# Patient Record
Sex: FEMALE | Race: WHITE | URBAN - METROPOLITAN AREA
[De-identification: names, ages, dates, MRNs, and addresses within clinical notes are randomized per-mention and may not be internally consistent; named-entity substitution may affect disease eponyms.]

---

## 2017-02-23 ENCOUNTER — TRANSFERRED RECORDS (OUTPATIENT)
Dept: HEALTH INFORMATION MANAGEMENT | Facility: CLINIC | Age: 27
End: 2017-02-23

## 2017-05-15 ASSESSMENT — ENCOUNTER SYMPTOMS
EYE REDNESS: 0
POOR WOUND HEALING: 0
DIFFICULTY URINATING: 0
SORE THROAT: 0
DIARRHEA: 0
DYSURIA: 0
NAIL CHANGES: 0
RECTAL PAIN: 0
DISTURBANCES IN COORDINATION: 0
STIFFNESS: 0
WEAKNESS: 0
VOMITING: 0
SKIN CHANGES: 0
PANIC: 0
RECTAL BLEEDING: 0
HOT FLASHES: 0
SPEECH CHANGE: 0
NECK PAIN: 1
TINGLING: 0
SINUS CONGESTION: 1
HEMATURIA: 0
ARTHRALGIAS: 1
BOWEL INCONTINENCE: 0
CONSTIPATION: 0
SINUS PAIN: 0
MUSCLE WEAKNESS: 0
DEPRESSION: 1
FLANK PAIN: 0
EYE WATERING: 0
DIZZINESS: 0
DOUBLE VISION: 0
EYE IRRITATION: 0
PARALYSIS: 0
BRUISES/BLEEDS EASILY: 1
NECK MASS: 0
NAUSEA: 0
BLOATING: 0
NERVOUS/ANXIOUS: 1
SWOLLEN GLANDS: 0
TROUBLE SWALLOWING: 0
ABDOMINAL PAIN: 0
SEIZURES: 0
INSOMNIA: 0
HEADACHES: 1
TASTE DISTURBANCE: 0
DECREASED CONCENTRATION: 0
MEMORY LOSS: 1
DECREASED LIBIDO: 1
LOSS OF CONSCIOUSNESS: 0
MYALGIAS: 0
BLOOD IN STOOL: 0
NUMBNESS: 0
HOARSE VOICE: 1
TREMORS: 0
MUSCLE CRAMPS: 0
JOINT SWELLING: 0
HEARTBURN: 1
EYE PAIN: 0
SMELL DISTURBANCE: 0
JAUNDICE: 0
BACK PAIN: 0

## 2017-05-15 ASSESSMENT — ANXIETY QUESTIONNAIRES
GAD7 TOTAL SCORE: 7
GAD7 TOTAL SCORE: 7
7. FEELING AFRAID AS IF SOMETHING AWFUL MIGHT HAPPEN: 0 = NOT AT ALL

## 2017-05-16 ENCOUNTER — OFFICE VISIT (OUTPATIENT)
Dept: OBGYN | Facility: CLINIC | Age: 27
End: 2017-05-16
Attending: OBSTETRICS & GYNECOLOGY
Payer: COMMERCIAL

## 2017-05-16 VITALS
SYSTOLIC BLOOD PRESSURE: 127 MMHG | DIASTOLIC BLOOD PRESSURE: 78 MMHG | HEART RATE: 79 BPM | BODY MASS INDEX: 24.35 KG/M2 | WEIGHT: 151.5 LBS | HEIGHT: 66 IN

## 2017-05-16 DIAGNOSIS — D25.1 INTRAMURAL LEIOMYOMA OF UTERUS: Primary | ICD-10-CM

## 2017-05-16 PROCEDURE — 99213 OFFICE O/P EST LOW 20 MIN: CPT | Mod: ZF

## 2017-05-16 ASSESSMENT — PAIN SCALES - GENERAL: PAINLEVEL: NO PAIN (0)

## 2017-05-16 ASSESSMENT — ANXIETY QUESTIONNAIRES: GAD7 TOTAL SCORE: 7

## 2017-05-16 NOTE — LETTER
"5/16/2017       RE: Indigo Daniels  [address]  [place] [address]     Dear Colleague,    Thank you for referring your patient, Indigo Daniels, to the [facility] at [facility]. Please see a copy of my visit note below.    25 yo P0 with 1 year history of worsening dysmenorrhea.  Had an episode in July, 2016 where she had increased pain for 2 weeks and was seen by a NP at [facility].  Ultrasound and MRI showed large posterior fibroid, ~10 cm which encroaches on endometrium.  She saw GYN who discussed Lupron and myomectomy, possibly robotic assisted.  Since then symptoms have been similar.  Not currently on any form of birth control other than condoms.  Does not currently desire pregnancy, but may in future.  She occasionally feels constipated and will typically have multiple bowel movements daily.  Does note increase urinary frequency, going about every 2 hours.  No nocturia.  Does not fluid restrict.  Continues to have pain with intercourse, worse on initial penetration.  Limits sexual activity secondary to pain.  Has not noted that lubricants or position changes helps with pain.    Patient states that her menses are q 31 days and last 4-6 day, heavier on first day, but not soaking a pad in one hour.  No history of STIs or abnormal pap smears.      Past Medical History:   Diagnosis Date     Rectal fissure      Past Surgical History:   Procedure Laterality Date     COLONOSCOPY  5/12/2014     Family History   Problem Relation Age of Onset     Uterine Cancer Paternal Grandmother      Other Cancer Mother      Skin Cancer     Anxiety Disorder Mother      Depression Mother      DIABETES Maternal Grandmother      Physical exam:  /78  Pulse 79  Ht 1.676 m (5' 6\")  Wt 68.7 kg (151 lb 8 oz)  LMP 05/16/2017  Breastfeeding? No  BMI 24.45 kg/m2  Gen'l: appears well, no distress  Abdomen: soft, NT, ND, no palpable masses  Vulva: normal, no " lesions  Urethra: normal  Vagina: pink, normal rugae, phsyiologic discharge present  Cervix: nulliparous, no lesion, no bleeding, distorted anteriorly  Uterus: minimal mobility, NT,posterior fibroid fills cul-de-sac, 14 week size  Adnexa: no palpable masses, NT  Rectum: Anus normal, no fissures noted, no rectovaginal exam done    U/s and MRI from 7/2016 reviewed  Ultrasound: uterus 9.5 x 3.1 x 5.3 cm, large mass extending from or immediately adjacent to the uterus posteriorly measuring approximately 10.7 x 0.7 x 8.4 cm. Endometrium 0.5cm, normal ovaries, no free fluid    MRI with contrast:  Heterogeneously enhancing mass within the posterior aspect of the uterus consistnet with a leiomyoma, measures 8.4 x 10.1 x 8.9 cm, abuts but doesn't involve the endometrium, intramural in location. Normal ovaries, endometrium 1.0 cm, junctional zone unremarkable    Assessment/Plan:  25 yo P0 with large symptomatic intramural fibroid    Medical and surgical options which are applicable for differing symptoms were reviewed including: OCPs, Mirena IUD, hysteroscopy, ablation, myomectomy, UFE, u/s surgery at [place] and hysterectomy.  Discussed in given patient's desire for future pregnancy, and symptoms most related to mass likely best options include myomectomy.  We discussed the possible benefit of Lupron prior to surgery, but given the position of the myoma and limited mobility of the uterus on exam I counseled that I thought pre-operative Lupron may still not allow robotic assisted laparoscopic surgery.  Patient is concerned about the side effects and potential costs of Lupron.  She will consider the options, but is leaning toward abdominal myomectomy.      I will review the MR images when I am able to see them.  I will contact her at that point to discussed more details of planned surgery.    Risk, benefits and alternatives of surgery including bleeding, infection, damage to nearby structures including but not limited to bladder,  ureter, bowel, blood vessels and nerves.  We discussed the differences in surgery times, risks and benefits of laparoscopic (robotic assisted) vs open myomectomy.  We discussed impact on future fertility and need for  deliveries and we discussed post-op recovery.    Recommend follow-up with PCP for non-gyn related complaints on ROS.    Tami Kolb MD

## 2017-05-16 NOTE — PROGRESS NOTES
25 yo P0 with 1 year history of worsening dysmenorrhea.  Had an episode in July, 2016 where she had increased pain for 2 weeks and was seen by a NP at [facility].  Ultrasound and MRI showed large posterior fibroid, ~10 cm which encroaches on endometrium.  She saw GYN who discussed Lupron and myomectomy, possibly robotic assisted.  Since then symptoms have been similar.  Not currently on any form of birth control other than condoms.  Does not currently desire pregnancy, but may in future.  She occasionally feels constipated and will typically have multiple bowel movements daily.  Does note increase urinary frequency, going about every 2 hours.  No nocturia.  Does not fluid restrict.  Continues to have pain with intercourse, worse on initial penetration.  Limits sexual activity secondary to pain.  Has not noted that lubricants or position changes helps with pain.    Patient states that her menses are q 31 days and last 4-6 day, heavier on first day, but not soaking a pad in one hour.  No history of STIs or abnormal pap smears.    Answers for HPI/ROS submitted by the patient on 5/15/2017 , reviewed and confirmed today  OPAL 7 TOTAL SCORE: 7  Q1: Little interest or pleasure in doing things: 0=Not at all  Q2: Feeling down, depressed or hopeless: 1=Several days  PHQ-2 Score: 1  General Symptoms: No  Skin Symptoms: Yes  HENT Symptoms: Yes  EYE SYMPTOMS: Yes  HEART SYMPTOMS: No  LUNG SYMPTOMS: No  INTESTINAL SYMPTOMS: Yes  URINARY SYMPTOMS: Yes  GYNECOLOGIC SYMPTOMS: Yes  BREAST SYMPTOMS: No  SKELETAL SYMPTOMS: Yes  BLOOD SYMPTOMS: Yes  NERVOUS SYSTEM SYMPTOMS: Yes  MENTAL HEALTH SYMPTOMS: Yes  Changes in hair: Yes  Changes in moles/birth marks: No  Itching: No  Rashes: No  Changes in nails: No  Acne: No  Hair in places you don't want it: No  Change in facial hair: No  Warts: No  Non-healing sores: No  Scarring: No  Flaking of skin: No  Color changes of hands/feet in cold : No  Sun sensitivity: No  Skin thickening:  No  Ear pain: No  Ear discharge: No  Hearing loss: No  Tinnitus: No  Nosebleeds: No  Congestion: Yes  Sinus pain: No  Trouble swallowing: No   Voice hoarseness: Yes  Mouth sores: No  Sore throat: No  Tooth pain: No  Gum tenderness: No  Bleeding gums: No  Change in taste: No  Change in sense of smell: No  Dry mouth: No  Hearing aid used: No  Neck lump: No  Eye pain: No  Vision loss: No  Dry eyes: No  Watery eyes: No  Eye bulging: No  Double vision: No  Flashing of lights: No  Spots: No  Floaters: Yes  Redness: No  Crossed eyes: No  Tunnel Vision: No  Yellowing of eyes: No  Eye irritation: No  Heart burn or indigestion: Yes  Nausea: No  Vomiting: No  Abdominal pain: No  Bloating: No  Constipation: No  Diarrhea: No  Blood in stool: No  Black stools: No  Rectal or Anal pain: No  Fecal incontinence: No  Rectal bleeding: No  Yellowing of skin or eyes: No  Vomit with blood: No  Change in stools: No  Hemorrhoids: No  Trouble holding urine or incontinence: Yes  Pain or burning: No  Trouble starting or stopping: No  Increased frequency of urination: Yes  Blood in urine: No  Decreased frequency of urination: No  Frequent nighttime urination: No  Flank pain: No  Difficulty emptying bladder: No  Back pain: No  Muscle aches: No  Neck pain: Yes  Swollen joints: No  Joint pain: Yes  Bone pain: No  Muscle cramps: No  Muscle weakness: No  Joint stiffness: No  Bone fracture: No  Anemia: No  Swollen glands: No  Easy bleeding or bruising: Yes  Edema or swelling: No  Trouble with coordination: No  Dizziness or trouble with balance: No  Fainting or black-out spells: No  Memory loss: Yes  Headache: Yes  Seizures: No  Speech problems: No  Tingling: No  Tremor: No  Weakness: No  Difficulty walking: No  Paralysis: No  Numbness: No  Bleeding or spotting between periods: No  Heavy or painful periods: No  Irregular periods: No  Vaginal discharge: No  Hot flashes: No  Vaginal dryness: No  Genital ulcers: No  Reduced libido: Yes  Painful  "intercourse: Yes  Difficulty with sexual arousal: No  Post-menopausal bleeding: No  Nervous or Anxious: Yes  Depression: Yes  Trouble sleeping: No  Trouble thinking or concentrating: No  Mood changes: No  Panic attacks: No    Past Medical History:   Diagnosis Date     Rectal fissure      Past Surgical History:   Procedure Laterality Date     COLONOSCOPY  5/12/2014     Family History   Problem Relation Age of Onset     Uterine Cancer Paternal Grandmother      Other Cancer Mother      Skin Cancer     Anxiety Disorder Mother      Depression Mother      DIABETES Maternal Grandmother      Physical exam:  /78  Pulse 79  Ht 1.676 m (5' 6\")  Wt 68.7 kg (151 lb 8 oz)  LMP 05/16/2017  Breastfeeding? No  BMI 24.45 kg/m2  Gen'l: appears well, no distress  Abdomen: soft, NT, ND, no palpable masses  Vulva: normal, no lesions  Urethra: normal  Vagina: pink, normal rugae, phsyiologic discharge present  Cervix: nulliparous, no lesion, no bleeding, distorted anteriorly  Uterus: minimal mobility, NT,posterior fibroid fills cul-de-sac, 14 week size  Adnexa: no palpable masses, NT  Rectum: Anus normal, no fissures noted, no rectovaginal exam done    U/s and MRI from 7/2016 reviewed  Ultrasound: uterus 9.5 x 3.1 x 5.3 cm, large mass extending from or immediately adjacent to the uterus posteriorly measuring approximately 10.7 x 0.7 x 8.4 cm. Endometrium 0.5cm, normal ovaries, no free fluid    MRI with contrast:  Heterogeneously enhancing mass within the posterior aspect of the uterus consistnet with a leiomyoma, measures 8.4 x 10.1 x 8.9 cm, abuts but doesn't involve the endometrium, intramural in location. Normal ovaries, endometrium 1.0 cm, junctional zone unremarkable    Assessment/Plan:  27 yo P0 with large symptomatic intramural fibroid    Medical and surgical options which are applicable for differing symptoms were reviewed including: OCPs, Mirena IUD, hysteroscopy, ablation, myomectomy, UFE, u/s surgery at [place] and " hysterectomy.  Discussed in given patient's desire for future pregnancy, and symptoms most related to mass likely best options include myomectomy.  We discussed the possible benefit of Lupron prior to surgery, but given the position of the myoma and limited mobility of the uterus on exam I counseled that I thought pre-operative Lupron may still not allow robotic assisted laparoscopic surgery.  Patient is concerned about the side effects and potential costs of Lupron.  She will consider the options, but is leaning toward abdominal myomectomy.      I will review the MR images when I am able to see them.  I will contact her at that point to discussed more details of planned surgery.    Risk, benefits and alternatives of surgery including bleeding, infection, damage to nearby structures including but not limited to bladder, ureter, bowel, blood vessels and nerves.  We discussed the differences in surgery times, risks and benefits of laparoscopic (robotic assisted) vs open myomectomy.  We discussed impact on future fertility and need for  deliveries and we discussed post-op recovery.    Recommend follow-up with PCP for non-gyn related complaints on ROS.    Tami Kolb MD

## 2017-05-16 NOTE — MR AVS SNAPSHOT
"              After Visit Summary   5/16/2017    Indigo Daniels    MRN: [id number]           Patient Information     Date Of Birth          1990        Visit Information        Provider Department      5/16/2017 10:15 AM Tami Kolb MD [facility]        Today's Diagnoses     Intramural leiomyoma of uterus    -  1       Follow-ups after your visit        Who to contact     Please call your clinic at [phone] to:    Ask questions about your health    Make or cancel appointments    Discuss your medicines    Learn about your test results    Speak to your doctor   If you have compliments or concerns about an experience at your clinic, or if you wish to file a complaint, please contact [facility] Patient Relations at [phone] or email us at [email or web address]         Additional Information About Your Visit        MyChart Information     [employer] gives you secure access to your electronic health record. If you see a primary care provider, you can also send messages to your care team and make appointments. If you have questions, please call your primary care clinic.  If you do not have a primary care provider, please call [phone] and they will assist you.      [employer] is an electronic gateway that provides easy, online access to your medical records. With [employer], you can request a clinic appointment, read your test results, renew a prescription or communicate with your care team.     To access your existing account, please contact your [facility] or call [phone] for assistance.        Care EveryWhere ID     This is your Care EveryWhere ID. This could be used by other organizations to access your [place] medical records  [id number]        Your Vitals Were     Pulse Height Last Period Breastfeeding? BMI (Body Mass Index)       79 1.676 m (5' 6\") 05/16/2017 No 24.45 kg/m2        Blood Pressure from " Last 3 Encounters:   05/16/17 127/78    Weight from Last 3 Encounters:   05/16/17 68.7 kg (151 lb 8 oz)              Today, you had the following     No orders found for display       Primary Care Provider Office Phone # Fax #    Stephon Jacobo [phone]       [facility] [address] [place] [address]        Thank you!     Thank you for choosing [facility]  for your care. Our goal is always to provide you with excellent care. Hearing back from our patients is one way we can continue to improve our services. Please take a few minutes to complete the written survey that you may receive in the mail after your visit with us. Thank you!             Your Updated Medication List - Protect others around you: Learn how to safely use, store and throw away your medicines at www.disposemymeds.org.      Notice  As of 5/16/2017  3:35 PM    You have not been prescribed any medications.

## 2017-05-18 ENCOUNTER — TELEPHONE (OUTPATIENT)
Dept: OBGYN | Facility: CLINIC | Age: 27
End: 2017-05-18

## 2017-05-18 NOTE — TELEPHONE ENCOUNTER
Left message for Indigo, that I was able to review images from MRI.  Recommend abdominal approach for myomectomy because the size of fibroid and that it fills the posterior pelvis.  I asked patient to call back to discuss and make surgery plans.    Tami Kolb MD

## 2017-05-24 DIAGNOSIS — D25.1 INTRAMURAL LEIOMYOMA OF UTERUS: Primary | ICD-10-CM

## 2017-05-24 RX ORDER — PHENAZOPYRIDINE HYDROCHLORIDE 100 MG/1
200 TABLET, FILM COATED ORAL ONCE
Status: CANCELLED | OUTPATIENT
Start: 2017-05-24 | End: 2017-05-24

## 2017-05-24 RX ORDER — ACETAMINOPHEN 325 MG/1
975 TABLET ORAL ONCE
Status: CANCELLED | OUTPATIENT
Start: 2017-05-24 | End: 2017-05-24

## 2017-05-26 ENCOUNTER — TELEPHONE (OUTPATIENT)
Dept: OBGYN | Facility: CLINIC | Age: 27
End: 2017-05-26

## 2017-06-13 ENCOUNTER — TELEPHONE (OUTPATIENT)
Dept: OBGYN | Facility: CLINIC | Age: 27
End: 2017-06-13

## 2017-06-13 NOTE — TELEPHONE ENCOUNTER
Confirmed new surgery date with patient 7/7/17 with arrival time at 10:30a.m with nothing to eat eight hours before scheduled surgery time and clear liquids up to two hours before scheduled surgery time, informed patient that she will need to sched a pre op physical before surgery.

## 2017-06-24 ENCOUNTER — HEALTH MAINTENANCE LETTER (OUTPATIENT)
Age: 27
End: 2017-06-24

## 2017-07-05 DIAGNOSIS — D25.1 INTRAMURAL LEIOMYOMA OF UTERUS: ICD-10-CM

## 2017-07-05 LAB
ABO + RH BLD: NORMAL
ABO + RH BLD: NORMAL
BLD GP AB SCN SERPL QL: NORMAL
BLOOD BANK CMNT PATIENT-IMP: NORMAL
ERYTHROCYTE [DISTWIDTH] IN BLOOD BY AUTOMATED COUNT: 12.8 % (ref 10–15)
HCT VFR BLD AUTO: 39.8 % (ref 35–47)
HGB BLD-MCNC: 13 G/DL (ref 11.7–15.7)
MCH RBC QN AUTO: 29 PG (ref 26.5–33)
MCHC RBC AUTO-ENTMCNC: 32.7 G/DL (ref 31.5–36.5)
MCV RBC AUTO: 89 FL (ref 78–100)
PLATELET # BLD AUTO: 324 10E9/L (ref 150–450)
RBC # BLD AUTO: 4.49 10E12/L (ref 3.8–5.2)
SPECIMEN EXP DATE BLD: NORMAL
WBC # BLD AUTO: 6.8 10E9/L (ref 4–11)

## 2017-07-05 PROCEDURE — 86850 RBC ANTIBODY SCREEN: CPT | Performed by: OBSTETRICS & GYNECOLOGY

## 2017-07-05 PROCEDURE — 85027 COMPLETE CBC AUTOMATED: CPT | Performed by: OBSTETRICS & GYNECOLOGY

## 2017-07-05 PROCEDURE — 36415 COLL VENOUS BLD VENIPUNCTURE: CPT | Performed by: OBSTETRICS & GYNECOLOGY

## 2017-07-05 PROCEDURE — 86901 BLOOD TYPING SEROLOGIC RH(D): CPT | Performed by: OBSTETRICS & GYNECOLOGY

## 2017-07-05 PROCEDURE — 86900 BLOOD TYPING SEROLOGIC ABO: CPT | Performed by: OBSTETRICS & GYNECOLOGY

## 2017-07-06 ENCOUNTER — ANESTHESIA EVENT (OUTPATIENT)
Dept: SURGERY | Facility: CLINIC | Age: 27
DRG: 743 | End: 2017-07-06
Payer: COMMERCIAL

## 2017-07-07 ENCOUNTER — SURGERY (OUTPATIENT)
Age: 27
End: 2017-07-07

## 2017-07-07 ENCOUNTER — ANESTHESIA (OUTPATIENT)
Dept: SURGERY | Facility: CLINIC | Age: 27
DRG: 743 | End: 2017-07-07
Payer: COMMERCIAL

## 2017-07-07 ENCOUNTER — HOSPITAL ENCOUNTER (INPATIENT)
Facility: CLINIC | Age: 27
LOS: 2 days | Discharge: HOME | DRG: 743 | End: 2017-07-09
Attending: OBSTETRICS & GYNECOLOGY | Admitting: OBSTETRICS & GYNECOLOGY
Payer: COMMERCIAL

## 2017-07-07 DIAGNOSIS — Z98.890 S/P MYOMECTOMY: Primary | ICD-10-CM

## 2017-07-07 LAB — HCG SERPL QL: NEGATIVE

## 2017-07-07 PROCEDURE — 25000128 H RX IP 250 OP 636: Performed by: OBSTETRICS & GYNECOLOGY

## 2017-07-07 PROCEDURE — 25000125 ZZHC RX 250: Performed by: NURSE ANESTHETIST, CERTIFIED REGISTERED

## 2017-07-07 PROCEDURE — 71000014 ZZH RECOVERY PHASE 1 LEVEL 2 FIRST HR: Performed by: OBSTETRICS & GYNECOLOGY

## 2017-07-07 PROCEDURE — 37000009 ZZH ANESTHESIA TECHNICAL FEE, EACH ADDTL 15 MIN: Performed by: OBSTETRICS & GYNECOLOGY

## 2017-07-07 PROCEDURE — 36000059 ZZH SURGERY LEVEL 3 EA 15 ADDTL MIN UMMC: Performed by: OBSTETRICS & GYNECOLOGY

## 2017-07-07 PROCEDURE — 25000132 ZZH RX MED GY IP 250 OP 250 PS 637: Performed by: OBSTETRICS & GYNECOLOGY

## 2017-07-07 PROCEDURE — 84703 CHORIONIC GONADOTROPIN ASSAY: CPT | Performed by: OBSTETRICS & GYNECOLOGY

## 2017-07-07 PROCEDURE — 37000008 ZZH ANESTHESIA TECHNICAL FEE, 1ST 30 MIN: Performed by: OBSTETRICS & GYNECOLOGY

## 2017-07-07 PROCEDURE — 71000015 ZZH RECOVERY PHASE 1 LEVEL 2 EA ADDTL HR: Performed by: OBSTETRICS & GYNECOLOGY

## 2017-07-07 PROCEDURE — 25000128 H RX IP 250 OP 636: Performed by: STUDENT IN AN ORGANIZED HEALTH CARE EDUCATION/TRAINING PROGRAM

## 2017-07-07 PROCEDURE — 25000128 H RX IP 250 OP 636: Performed by: NURSE ANESTHETIST, CERTIFIED REGISTERED

## 2017-07-07 PROCEDURE — 25000132 ZZH RX MED GY IP 250 OP 250 PS 637: Performed by: STUDENT IN AN ORGANIZED HEALTH CARE EDUCATION/TRAINING PROGRAM

## 2017-07-07 PROCEDURE — 88307 TISSUE EXAM BY PATHOLOGIST: CPT | Mod: 26 | Performed by: OBSTETRICS & GYNECOLOGY

## 2017-07-07 PROCEDURE — C9290 INJ, BUPIVACAINE LIPOSOME: HCPCS | Performed by: STUDENT IN AN ORGANIZED HEALTH CARE EDUCATION/TRAINING PROGRAM

## 2017-07-07 PROCEDURE — C1765 ADHESION BARRIER: HCPCS | Performed by: OBSTETRICS & GYNECOLOGY

## 2017-07-07 PROCEDURE — 27210794 ZZH OR GENERAL SUPPLY STERILE: Performed by: OBSTETRICS & GYNECOLOGY

## 2017-07-07 PROCEDURE — 0UB90ZZ EXCISION OF UTERUS, OPEN APPROACH: ICD-10-PCS | Performed by: OBSTETRICS & GYNECOLOGY

## 2017-07-07 PROCEDURE — 40000171 ZZH STATISTIC PRE-PROCEDURE ASSESSMENT III: Performed by: OBSTETRICS & GYNECOLOGY

## 2017-07-07 PROCEDURE — 88307 TISSUE EXAM BY PATHOLOGIST: CPT | Performed by: OBSTETRICS & GYNECOLOGY

## 2017-07-07 PROCEDURE — 25000128 H RX IP 250 OP 636: Performed by: ANESTHESIOLOGY

## 2017-07-07 PROCEDURE — 36000057 ZZH SURGERY LEVEL 3 1ST 30 MIN - UMMC: Performed by: OBSTETRICS & GYNECOLOGY

## 2017-07-07 PROCEDURE — 12000001 ZZH R&B MED SURG/OB UMMC

## 2017-07-07 PROCEDURE — 25000125 ZZHC RX 250: Performed by: STUDENT IN AN ORGANIZED HEALTH CARE EDUCATION/TRAINING PROGRAM

## 2017-07-07 PROCEDURE — 25000566 ZZH SEVOFLURANE, EA 15 MIN: Performed by: OBSTETRICS & GYNECOLOGY

## 2017-07-07 RX ORDER — LIDOCAINE 40 MG/G
CREAM TOPICAL
Status: DISCONTINUED | OUTPATIENT
Start: 2017-07-07 | End: 2017-07-09 | Stop reason: HOSPADM

## 2017-07-07 RX ORDER — SODIUM CHLORIDE, SODIUM LACTATE, POTASSIUM CHLORIDE, CALCIUM CHLORIDE 600; 310; 30; 20 MG/100ML; MG/100ML; MG/100ML; MG/100ML
INJECTION, SOLUTION INTRAVENOUS CONTINUOUS
Status: DISCONTINUED | OUTPATIENT
Start: 2017-07-07 | End: 2017-07-09 | Stop reason: HOSPADM

## 2017-07-07 RX ORDER — SODIUM CHLORIDE, SODIUM LACTATE, POTASSIUM CHLORIDE, CALCIUM CHLORIDE 600; 310; 30; 20 MG/100ML; MG/100ML; MG/100ML; MG/100ML
INJECTION, SOLUTION INTRAVENOUS CONTINUOUS
Status: DISCONTINUED | OUTPATIENT
Start: 2017-07-07 | End: 2017-07-07 | Stop reason: HOSPADM

## 2017-07-07 RX ORDER — PHENAZOPYRIDINE HYDROCHLORIDE 200 MG/1
200 TABLET, FILM COATED ORAL ONCE
Status: COMPLETED | OUTPATIENT
Start: 2017-07-07 | End: 2017-07-07

## 2017-07-07 RX ORDER — ONDANSETRON 2 MG/ML
4 INJECTION INTRAMUSCULAR; INTRAVENOUS EVERY 30 MIN PRN
Status: DISCONTINUED | OUTPATIENT
Start: 2017-07-07 | End: 2017-07-07 | Stop reason: HOSPADM

## 2017-07-07 RX ORDER — SODIUM CHLORIDE, SODIUM LACTATE, POTASSIUM CHLORIDE, CALCIUM CHLORIDE 600; 310; 30; 20 MG/100ML; MG/100ML; MG/100ML; MG/100ML
INJECTION, SOLUTION INTRAVENOUS CONTINUOUS PRN
Status: DISCONTINUED | OUTPATIENT
Start: 2017-07-07 | End: 2017-07-07

## 2017-07-07 RX ORDER — KETOROLAC TROMETHAMINE 30 MG/ML
30 INJECTION, SOLUTION INTRAMUSCULAR; INTRAVENOUS EVERY 6 HOURS PRN
Status: DISCONTINUED | OUTPATIENT
Start: 2017-07-07 | End: 2017-07-09 | Stop reason: HOSPADM

## 2017-07-07 RX ORDER — KETOROLAC TROMETHAMINE 30 MG/ML
INJECTION, SOLUTION INTRAMUSCULAR; INTRAVENOUS PRN
Status: DISCONTINUED | OUTPATIENT
Start: 2017-07-07 | End: 2017-07-07

## 2017-07-07 RX ORDER — FENTANYL CITRATE 50 UG/ML
25-50 INJECTION, SOLUTION INTRAMUSCULAR; INTRAVENOUS EVERY 5 MIN PRN
Status: DISCONTINUED | OUTPATIENT
Start: 2017-07-07 | End: 2017-07-07 | Stop reason: HOSPADM

## 2017-07-07 RX ORDER — ACETAMINOPHEN 325 MG/1
975 TABLET ORAL ONCE
Status: COMPLETED | OUTPATIENT
Start: 2017-07-07 | End: 2017-07-07

## 2017-07-07 RX ORDER — CEFAZOLIN SODIUM 2 G/100ML
2 INJECTION, SOLUTION INTRAVENOUS
Status: COMPLETED | OUTPATIENT
Start: 2017-07-07 | End: 2017-07-07

## 2017-07-07 RX ORDER — ONDANSETRON 2 MG/ML
4 INJECTION INTRAMUSCULAR; INTRAVENOUS EVERY 6 HOURS PRN
Status: DISCONTINUED | OUTPATIENT
Start: 2017-07-07 | End: 2017-07-09 | Stop reason: HOSPADM

## 2017-07-07 RX ORDER — ONDANSETRON 4 MG/1
4 TABLET, ORALLY DISINTEGRATING ORAL EVERY 6 HOURS PRN
Status: DISCONTINUED | OUTPATIENT
Start: 2017-07-07 | End: 2017-07-09 | Stop reason: HOSPADM

## 2017-07-07 RX ORDER — NALOXONE HYDROCHLORIDE 0.4 MG/ML
.1-.4 INJECTION, SOLUTION INTRAMUSCULAR; INTRAVENOUS; SUBCUTANEOUS
Status: DISCONTINUED | OUTPATIENT
Start: 2017-07-07 | End: 2017-07-09 | Stop reason: HOSPADM

## 2017-07-07 RX ORDER — CEFAZOLIN SODIUM 1 G/3ML
1 INJECTION, POWDER, FOR SOLUTION INTRAMUSCULAR; INTRAVENOUS SEE ADMIN INSTRUCTIONS
Status: DISCONTINUED | OUTPATIENT
Start: 2017-07-07 | End: 2017-07-07 | Stop reason: HOSPADM

## 2017-07-07 RX ORDER — ONDANSETRON 4 MG/1
4 TABLET, ORALLY DISINTEGRATING ORAL EVERY 30 MIN PRN
Status: DISCONTINUED | OUTPATIENT
Start: 2017-07-07 | End: 2017-07-07 | Stop reason: HOSPADM

## 2017-07-07 RX ORDER — AMOXICILLIN 250 MG
1-2 CAPSULE ORAL 2 TIMES DAILY
Status: DISCONTINUED | OUTPATIENT
Start: 2017-07-07 | End: 2017-07-07

## 2017-07-07 RX ORDER — FLUMAZENIL 0.1 MG/ML
0.2 INJECTION, SOLUTION INTRAVENOUS
Status: DISCONTINUED | OUTPATIENT
Start: 2017-07-07 | End: 2017-07-07 | Stop reason: HOSPADM

## 2017-07-07 RX ORDER — NALOXONE HYDROCHLORIDE 0.4 MG/ML
.1-.4 INJECTION, SOLUTION INTRAMUSCULAR; INTRAVENOUS; SUBCUTANEOUS
Status: DISCONTINUED | OUTPATIENT
Start: 2017-07-07 | End: 2017-07-07 | Stop reason: HOSPADM

## 2017-07-07 RX ORDER — GLYCOPYRROLATE 0.2 MG/ML
INJECTION, SOLUTION INTRAMUSCULAR; INTRAVENOUS PRN
Status: DISCONTINUED | OUTPATIENT
Start: 2017-07-07 | End: 2017-07-07

## 2017-07-07 RX ORDER — ACETAMINOPHEN 325 MG/1
975 TABLET ORAL EVERY 8 HOURS
Status: DISCONTINUED | OUTPATIENT
Start: 2017-07-07 | End: 2017-07-07

## 2017-07-07 RX ORDER — PROCHLORPERAZINE MALEATE 5 MG
5-10 TABLET ORAL EVERY 6 HOURS PRN
Status: DISCONTINUED | OUTPATIENT
Start: 2017-07-07 | End: 2017-07-09 | Stop reason: HOSPADM

## 2017-07-07 RX ORDER — HYDROMORPHONE HYDROCHLORIDE 1 MG/ML
.3-.5 INJECTION, SOLUTION INTRAMUSCULAR; INTRAVENOUS; SUBCUTANEOUS
Status: ACTIVE | OUTPATIENT
Start: 2017-07-07 | End: 2017-07-08

## 2017-07-07 RX ORDER — ONDANSETRON 2 MG/ML
INJECTION INTRAMUSCULAR; INTRAVENOUS PRN
Status: DISCONTINUED | OUTPATIENT
Start: 2017-07-07 | End: 2017-07-07

## 2017-07-07 RX ORDER — DIPHENHYDRAMINE HCL 25 MG
25 CAPSULE ORAL EVERY 6 HOURS PRN
Status: DISCONTINUED | OUTPATIENT
Start: 2017-07-07 | End: 2017-07-09 | Stop reason: HOSPADM

## 2017-07-07 RX ORDER — LIDOCAINE 40 MG/G
CREAM TOPICAL
Status: DISCONTINUED | OUTPATIENT
Start: 2017-07-07 | End: 2017-07-07 | Stop reason: HOSPADM

## 2017-07-07 RX ORDER — IBUPROFEN 600 MG/1
600 TABLET, FILM COATED ORAL EVERY 6 HOURS PRN
Status: DISCONTINUED | OUTPATIENT
Start: 2017-07-12 | End: 2017-07-08

## 2017-07-07 RX ORDER — FENTANYL CITRATE 50 UG/ML
INJECTION, SOLUTION INTRAMUSCULAR; INTRAVENOUS PRN
Status: DISCONTINUED | OUTPATIENT
Start: 2017-07-07 | End: 2017-07-07

## 2017-07-07 RX ORDER — ACETAMINOPHEN 325 MG/1
650 TABLET ORAL EVERY 4 HOURS PRN
Status: DISCONTINUED | OUTPATIENT
Start: 2017-07-10 | End: 2017-07-09 | Stop reason: HOSPADM

## 2017-07-07 RX ORDER — LIDOCAINE HYDROCHLORIDE 20 MG/ML
INJECTION, SOLUTION INFILTRATION; PERINEURAL PRN
Status: DISCONTINUED | OUTPATIENT
Start: 2017-07-07 | End: 2017-07-07

## 2017-07-07 RX ORDER — NEOSTIGMINE METHYLSULFATE 1 MG/ML
VIAL (ML) INJECTION PRN
Status: DISCONTINUED | OUTPATIENT
Start: 2017-07-07 | End: 2017-07-07

## 2017-07-07 RX ORDER — FENTANYL CITRATE 50 UG/ML
25-50 INJECTION, SOLUTION INTRAMUSCULAR; INTRAVENOUS
Status: DISCONTINUED | OUTPATIENT
Start: 2017-07-07 | End: 2017-07-07 | Stop reason: HOSPADM

## 2017-07-07 RX ORDER — BUPIVACAINE HYDROCHLORIDE AND EPINEPHRINE 2.5; 5 MG/ML; UG/ML
INJECTION, SOLUTION INFILTRATION; PERINEURAL PRN
Status: DISCONTINUED | OUTPATIENT
Start: 2017-07-07 | End: 2017-07-07

## 2017-07-07 RX ORDER — OXYCODONE HYDROCHLORIDE 5 MG/1
5-10 TABLET ORAL
Status: DISCONTINUED | OUTPATIENT
Start: 2017-07-07 | End: 2017-07-09 | Stop reason: HOSPADM

## 2017-07-07 RX ORDER — DEXAMETHASONE SODIUM PHOSPHATE 4 MG/ML
INJECTION, SOLUTION INTRA-ARTICULAR; INTRALESIONAL; INTRAMUSCULAR; INTRAVENOUS; SOFT TISSUE PRN
Status: DISCONTINUED | OUTPATIENT
Start: 2017-07-07 | End: 2017-07-07

## 2017-07-07 RX ORDER — PROPOFOL 10 MG/ML
INJECTION, EMULSION INTRAVENOUS PRN
Status: DISCONTINUED | OUTPATIENT
Start: 2017-07-07 | End: 2017-07-07

## 2017-07-07 RX ORDER — DIPHENHYDRAMINE HYDROCHLORIDE 50 MG/ML
25 INJECTION INTRAMUSCULAR; INTRAVENOUS EVERY 6 HOURS PRN
Status: DISCONTINUED | OUTPATIENT
Start: 2017-07-07 | End: 2017-07-09 | Stop reason: HOSPADM

## 2017-07-07 RX ADMIN — SENNOSIDES 17.6 MG: 8.8 SYRUP ORAL at 21:30

## 2017-07-07 RX ADMIN — KETOROLAC TROMETHAMINE 30 MG: 30 INJECTION, SOLUTION INTRAMUSCULAR at 15:38

## 2017-07-07 RX ADMIN — OXYCODONE HYDROCHLORIDE 5 MG: 5 TABLET ORAL at 19:10

## 2017-07-07 RX ADMIN — FENTANYL CITRATE 50 MCG: 50 INJECTION, SOLUTION INTRAMUSCULAR; INTRAVENOUS at 13:23

## 2017-07-07 RX ADMIN — CEFAZOLIN SODIUM 2 G: 2 INJECTION, SOLUTION INTRAVENOUS at 14:03

## 2017-07-07 RX ADMIN — SODIUM CHLORIDE, POTASSIUM CHLORIDE, SODIUM LACTATE AND CALCIUM CHLORIDE: 600; 310; 30; 20 INJECTION, SOLUTION INTRAVENOUS at 13:35

## 2017-07-07 RX ADMIN — DEXAMETHASONE SODIUM PHOSPHATE 6 MG: 4 INJECTION, SOLUTION INTRAMUSCULAR; INTRAVENOUS at 14:15

## 2017-07-07 RX ADMIN — GLYCOPYRROLATE 0.6 MG: 0.2 INJECTION, SOLUTION INTRAMUSCULAR; INTRAVENOUS at 16:01

## 2017-07-07 RX ADMIN — ONDANSETRON 4 MG: 2 INJECTION INTRAMUSCULAR; INTRAVENOUS at 15:38

## 2017-07-07 RX ADMIN — BUPIVACAINE 20 ML: 13.3 INJECTION, SUSPENSION, LIPOSOMAL INFILTRATION at 13:25

## 2017-07-07 RX ADMIN — ONDANSETRON 4 MG: 2 INJECTION INTRAMUSCULAR; INTRAVENOUS at 16:48

## 2017-07-07 RX ADMIN — ACETAMINOPHEN 973 MG: 325 SOLUTION ORAL at 21:29

## 2017-07-07 RX ADMIN — VASOPRESSIN 6 ML: 20 INJECTION, SOLUTION INTRAMUSCULAR; SUBCUTANEOUS at 14:44

## 2017-07-07 RX ADMIN — MIDAZOLAM 2 MG: 1 INJECTION INTRAMUSCULAR; INTRAVENOUS at 13:23

## 2017-07-07 RX ADMIN — ACETAMINOPHEN 975 MG: 325 TABLET, FILM COATED ORAL at 11:57

## 2017-07-07 RX ADMIN — PROCHLORPERAZINE EDISYLATE 5 MG: 5 INJECTION INTRAMUSCULAR; INTRAVENOUS at 17:04

## 2017-07-07 RX ADMIN — FENTANYL CITRATE 150 MCG: 50 INJECTION, SOLUTION INTRAMUSCULAR; INTRAVENOUS at 13:53

## 2017-07-07 RX ADMIN — PROPOFOL 100 MG: 10 INJECTION, EMULSION INTRAVENOUS at 13:53

## 2017-07-07 RX ADMIN — LIDOCAINE HYDROCHLORIDE 100 MG: 20 INJECTION, SOLUTION INFILTRATION; PERINEURAL at 13:53

## 2017-07-07 RX ADMIN — MIDAZOLAM HYDROCHLORIDE 2 MG: 1 INJECTION, SOLUTION INTRAMUSCULAR; INTRAVENOUS at 13:35

## 2017-07-07 RX ADMIN — KETOROLAC TROMETHAMINE 30 MG: 30 INJECTION, SOLUTION INTRAMUSCULAR at 21:30

## 2017-07-07 RX ADMIN — OXYCODONE HYDROCHLORIDE 5 MG: 5 TABLET ORAL at 22:52

## 2017-07-07 RX ADMIN — BUPIVACAINE HYDROCHLORIDE AND EPINEPHRINE BITARTRATE 20 ML: 2.5; .005 INJECTION, SOLUTION INFILTRATION; PERINEURAL at 13:25

## 2017-07-07 RX ADMIN — NEOSTIGMINE METHYLSULFATE 3 MG: 1 INJECTION INTRAMUSCULAR; INTRAVENOUS; SUBCUTANEOUS at 16:01

## 2017-07-07 RX ADMIN — Medication 35 MG: at 13:53

## 2017-07-07 RX ADMIN — HYDROMORPHONE HYDROCHLORIDE 0.3 MG: 1 INJECTION, SOLUTION INTRAMUSCULAR; INTRAVENOUS; SUBCUTANEOUS at 17:20

## 2017-07-07 RX ADMIN — SODIUM CHLORIDE, POTASSIUM CHLORIDE, SODIUM LACTATE AND CALCIUM CHLORIDE: 600; 310; 30; 20 INJECTION, SOLUTION INTRAVENOUS at 19:10

## 2017-07-07 RX ADMIN — SODIUM CHLORIDE, POTASSIUM CHLORIDE, SODIUM LACTATE AND CALCIUM CHLORIDE: 600; 310; 30; 20 INJECTION, SOLUTION INTRAVENOUS at 15:30

## 2017-07-07 RX ADMIN — FENTANYL CITRATE 50 MCG: 50 INJECTION, SOLUTION INTRAMUSCULAR; INTRAVENOUS at 14:21

## 2017-07-07 RX ADMIN — PHENAZOPYRIDINE HYDROCHLORIDE 200 MG: 200 TABLET, COATED ORAL at 11:57

## 2017-07-07 ASSESSMENT — ENCOUNTER SYMPTOMS: APNEA: 0

## 2017-07-07 NOTE — ANESTHESIA PREPROCEDURE EVALUATION
"  Anesthesia Evaluation    ROS/Med Hx   Comments: Met with Indigo who has been NPO and  is scheduled for:   Procedure(s):  MYOMECTOMY UTERUS  Past Medical History:   Rectal fissure  Past Surgical History:  5/12/2014: COLONOSCOPY  Allergies:  No Known Allergies          Cardiovascular Findings - negative ROS    Neuro Findings - negative ROS    Pulmonary Findings   (-) asthma and apnea    HENT Findings - negative HENT ROS        GI/Hepatic/Renal Findings   (+) GERD    GERD is well controlled    Endocrine/Metabolic Findings - negative ROS      Genetic/Syndrome Findings - negative genetics/syndromes ROS    Hematology/Oncology Findings - negative hematology/oncology ROS    Additional Notes  Has uterine fibroids;  Has not had GA in the past.  FH -negative for GA problems.       Physical Exam      Airway   Mallampati: I  TM distance: >3 FB  Neck ROM: full    Dental   Comment: stable    Cardiovascular   Rhythm and rate: regular and normal      Pulmonary    breath sounds clear to auscultation    Other findings: /77  Temp 36.7  C (98.1  F) (Oral)  Resp 15  Ht 1.676 m (5' 6\")  Wt 68.4 kg (150 lb 12.7 oz)  LMP 06/19/2017  SpO2 99%  BMI 24.34 kg/m2               CBC:   Lab Test        07/05/17                       1448          WBC          6.8           HGB          13.0          PLT          324               Blood Bank:  Lab Results       Component                Value               Date                       ABO                      O                   07/05/2017                 RH                        Pos                07/05/2017                 AS                       Neg                 07/05/2017          Current Facility-Administered Medications:      fentaNYL Citrate (PF) (SUBLIMAZE) injection 25-50 mcg, 25-50 mcg, Intravenous, Q2 Min PRN, Jose Alejandra MD     midazolam (VERSED) injection 1-2 mg, 1-2 mg, Intravenous, Q4 Min PRN, Jose Alejandra MD     naloxone (NARCAN) injection 0.1-0.4 mg, 0.1-0.4 " mg, Intravenous, Q2 Min PRN, Jose Alejandra MD     flumazenil (ROMAZICON) injection 0.2 mg, 0.2 mg, Intravenous, q1 min prn, Jose Alejandra MD     bupivacaine liposome (EXPAREL) 1.3 % LA inj susp 20 mL, 20 mL, Infiltration, DURING SURGERY, Jose Alejandra MD     ceFAZolin sodium-dextrose (ANCEF) infusion 2 g, 2 g, Intravenous, Pre-Op/Pre-procedure x 1 dose, Tami Kolb MD     ceFAZolin (ANCEF) 1 g vial to attach to  ml bag for ADULT or 50 ml bag for PEDS, 1 g, Intravenous, See Admin Instructions, Tami Kolb MD     lidocaine 1 % 1 mL, 1 mL, Other, Q1H PRN, Tera Adhikari MD     lidocaine (LMX4) kit, , Topical, Q1H PRN, Tera Adhikari MD     sodium chloride (PF) 0.9% PF flush 3 mL, 3 mL, Intracatheter, Q1H PRN, Tera Adhikari MD     sodium chloride (PF) 0.9% PF flush 3 mL, 3 mL, Intracatheter, Q8H, Tera Adhikari MD     lactated ringers infusion, , Intravenous, Continuous, Tera Adhikari MD    Allergies:  No Known Allergies              Anesthesia Plan      History & Physical Review  History and physical reviewed and following examination, relevant changes include: also conducted a medical interview with her    ASA Status:  1 .    NPO Status:  > 6 hours    Plan for General and ETT with Propofol and Intravenous induction. Maintenance will be Balanced.    PONV prophylaxis:  Ondansetron (or other 5HT-3) and Dexamethasone or Solumedrol  Additional equipment: 2nd IV Indigo requests GA. Procedures and risks including those of positioning explained. She understood and consented. Qs answered.      Postoperative Care  Postoperative pain management:  Multi-modal analgesia.      Consents  Anesthetic plan, risks, benefits and alternatives discussed with:  Patient.  Use of blood products discussed: Yes.   Use of blood products discussed with Patient.  Consented to blood products (verbal consent obtained).  .

## 2017-07-07 NOTE — DISCHARGE SUMMARY
Gynecology Discharge Summary    Indigo Daniels    YOB: 1990  MRN# [id number]            Date of Admission:  7/7/2017  Date of Discharge:  7/9/2017  Admitting Physician:  Tami Kolb MD  Discharge Physician:  Tami Kolb MD  Discharging Service:  Gynecology     Primary Provider: Stephon Jacobo          Admission Diagnoses:   - Fibroids with dysmenorrhea  - Rectal fissure           Discharge Diagnosis:   - Same as above s/p abdominal myomectomy              Procedures:   Abdominal myomectomy           Medications Prior to Admission:   The patient was not taking any medications prior to admission          Discharge Medications:        Review of your medicines      START taking       Dose / Directions    acetaminophen 32 mg/mL solution   Commonly known as:  TYLENOL   Used for:  S/P myomectomy        Dose:  975 mg   Take 30.45 mLs (975 mg) by mouth every 8 hours   Quantity:  400 mL   Refills:  0       ibuprofen 100 MG/5ML suspension   Commonly known as:  CHILD IBUPROFEN   Used for:  S/P myomectomy        600 mg (30 mL) po q 6 hours prn pain   Quantity:  946 mL   Refills:  0       ondansetron 4 MG ODT tab   Commonly known as:  ZOFRAN-ODT   Used for:  S/P myomectomy        Dose:  4 mg   Take 1 tablet (4 mg) by mouth every 6 hours as needed for nausea or vomiting   Quantity:  5 tablet   Refills:  0       oxyCODONE 5 MG IR tablet   Commonly known as:  ROXICODONE   Used for:  S/P myomectomy        Dose:  5-10 mg   Take 1-2 tablets (5-10 mg) by mouth every 4 hours as needed for moderate to severe pain   Quantity:  30 tablet   Refills:  0            Where to get your medicines      These medications were sent to [facility] [place], [place] - [address]  [address] [address]     Phone:  [phone]      acetaminophen 32 mg/mL solution     ibuprofen 100 MG/5ML suspension     ondansetron 4 MG ODT tab         Some of these will need a paper prescription  and others can be bought over the counter. Ask your nurse if you have questions.     Bring a paper prescription for each of these medications      oxyCODONE 5 MG IR tablet                   Consultations:   None            Brief History of Illness:   Indigo Daniels is 26 year old G0 female who presented to clinic with worsening dysmenorrhea and increasing abdominal pain since 2016. She was found to have a large posterior fibroid measuring 10 cm. Expectant, medical and surgical management options were discussed. She desired surgical management. She was counseled on laparoscopic versus open approach and open approach was recommended given fibroid size and location. She desired to maintain her fertility. Risks, benefits and alternatives to above stated procedure were discussed. Patient desired to proceed and written informed consent was obtained prior to proceeding.           Hospital Course:     The intraoperative course was uncomplicated.  mL. Please see operative note for full details of abdominal myomectomy.     Operative Findings: EUA revealed large uterus ~ 14 cm in size, large, firm mass c/w fibroid felt posterior. Normal appearing external genitalia. Normal appearing uterus, bilateral fallopian tubes and ovaries. 14 cm intramural fibroid located in posterior uterine wall along left side. 3 cm subserosal fibroid located medial and superior to left cornua on anterior surface of fundus. Normal appearing bowel. No fascial or intraabdominal adhesions. Hemostatic surgical sites at end of case.     Given extent of uterine surgery completed without entry into the endometrial cavity during dissection of anterior fibroids; she should not be allowed to labor if she were ever to get pregnant. She would require  section at 36-37 weeks.     Her postoperative course was uncomplicated. She discharged on POD#2. On discharge, she was afebrile, pain well controlled on PO meds, tolerating regular diet with no  nausea or vomiting, passing flatus and her vitals were within normal limits. Her hemoglobin was 10.1 on discharge and she continued her PTA iron supplements. Please see progress note on day of discharge for further details of exam and condition on day of discharge.          Discharge Instructions and Follow-Up:   Discharge diet: Regular   Discharge activity: No heavy lifting for 6 week(s)   Discharge follow-up: Follow up with GYN provider in 2-4 weeks for postop visit   Other instructions: None     Maria Antonia Steiner MD  OB/GYN PGY3    Appreciate Dr. Steiner' summary above, patient also seen and examined by me on the day of discharge. I agree with the summary above.   Tami Kolb MD

## 2017-07-07 NOTE — OP NOTE
[facility]  Operative Note    Indigo Daniels    2017   MRN [id number]      Date of Operation: 2017   Surgeon: Dr. Kolb  Assistants: Daija Catalan MD, MPH G4, Jennifer Lucero MD G1, Joel Ortega, MS3     Pre-operative diagnosis:   AUB-L, dysmenorrhea  Fibroid uterus  Desires to retain fertility/uterus     Post-operative diagnosis:   Same s/p abdominal myomectomy  Two uterine fibroids excised without entry into endometrial cavity  Requires primary  at 36-37 weeks for all future pregnancies     Procedures: Abdominal myomectomy     Anesthesia: GETA  IVF: 1200 mL crystalloid  UOP: 300 mL, clear, yellow urine  EBL: 200 mL   Intraoperative medications: 2g ancef    Indications: Indigo Daniels is 26 year old G0 female who presented to clinic with worsening dysmenorrhea and increasing abdominal pain since 2016. She was found to have a large posterior fibroid measuring 10 cm. Expectant, medical and surgical management options were discussed. She desired surgical management. She was counseled on laparoscopic versus open approach and open approach was recommended given fibroid size and location. She desired to maintain her fertility. Risks, benefits and alternatives to above stated procedure were discussed. Patient desired to proceed and written informed consent was obtained prior to proceeding.     Complications: None apparent    Findings: EUA revealed large uterus ~ 14 cm in size, large, firm mass c/w fibroid felt posterior. Normal appearing external genitalia. Normal appearing uterus, bilateral fallopian tubes and ovaries. 14 cm intramural fibroid located in posterior uterine wall along left side. 3 cm subserosal fibroid located medial and superior to left cornua on anterior surface of fundus. Normal appearing bowel. No fascial or intraabdominal adhesions. Hemostatic surgical sites at end of case.    Given extent of uterine surgery completed without entry into  the endometrial cavity during dissection of posterior fibroid; she should not be allowed to labor if she were ever to get pregnant. She would need  at 36-37 weeks.     Specimen: two uterine fibroids    Procedure Details: The patient was taken back to the operating room where she was placed under general endotrachial anesthesia with SCDs in place. TAPS were placed preoperatively. A yarbrough catheter was placed. She was prepped and draped in the usual sterile fashion in the dorsal supine position. A safety time out was performed. 2g Ancef was administered. An 8 cm Pfannenstiel skin incision was made approximately 2 cm above the pubic bone with the scalpel and carried through using cautery to the underlying layer to the fascia. No fascial adhesions noted. The fascia was incised in the midline with cautery on cut function and extended laterally with cautery using Arcadio to tent fascia off of rectus muscles. The superior aspect of the fascial incision was grasped with kocher clamps, elevated and the underlying rectus muscles dissected off with a combination of blunt and sharp dissection. Attention was then turned to the inferior aspect of the incision, which in a similar fashion was grasped, tented up and the rectus muscle dissected off the fascia to the pubic bone. The rectus muscles were  in the midline using arcadio clamps. The peritoneum was identified, grasped with arcadio clamps, elevated and entered sharply using Metzenbaum scissors. This entry point was extended with both sharp and blunt dissection.    The uterus was too large to be exteriorized and notable for findings as described above. Three moist laps were used to pack bowel into upper abdomen away from the uterus. Vasopressin was injected underneath the serosa overlying the fibroid with good blanching noted. Incision made with cautery through overlying serosa until the fibroid was noted. Edges of incision were grasped with Allis clamps. The  fibroid was then dissected out with sharp dissection using handheld Ligasure. Penetrating clamps were used to grasp the fibroid and elevate it. During dissection, the uterus was eventually able to be exteriorized. Further sharp and blunt dissection completed until the fibroid was free on all sides.  Two large feeder vessels were noted during dissection. Each was grasped with right angle and suture ligated with figure of X using 3-0 vicryl. The base of the fibroid was ligated using the handheld LigaSure. Oozing areas were controlled with cautery and pressure held. One additional subserosal, exophytic fibroid was identified. The handheld LigaSure was used to excise fibroid without difficulty. After the fibroid was removed, the area of excision was palpated and the endometrial cavity had not been entered. The myometrial incision was then repaired with multiple layers of 2-0 Vicryl in a running locked fashion with good hemostasis noted. A 2-0 vicryl was then used in a horizontal mattress fashion to better approximate serosal edges and defect was noted to be completely closed. A 3-0 vicryl was then used to imbricate two uterine incisions in a running locked fashion. The uterus was examined and areas of oozing were controlled with pressure held. Both incisions were noted to be hemostatic and the uterus was returned to abdominal cavity. All instruments removed from abdomen. Moist laps x 3 were removed from abdomen. A piece of interceed was placed over the uterine incision.     Kocher clamps were used to elevate the superior and inferior edges of the fascia. Fascia and underlying rectus muscles were then examined and areas of oozing controlled with cautery. Good hemostasis noted. The fascia was closed with 0-vicryl in a running fashion. After closure, a small defect was noted in midline of fascia. This area was grasped with Kochers and elevated. An figure of X suture was placed using 0-vicryl. The fascia was then palpated  and noted to be intact. The subcutaneous tissue was irrigated and areas of bleeding were controlled with cautery. The subcutaneous tissue was < 3 cm and was not closed. The skin was closed with 4-0 vicryl in subcuticular fashion. Sterile bandage placed using steristrips, ABD and tape.     All lap, instrument, and sharps counts were correct times two. Dr. Kolb was scrubbed and present for the procedure.  Patient awoken in OR and taken to PACU in stable condition. Patient was admitted to for postoperative cares.    Daija Catalan MD, MPH  OB/GYN Resident G4  7/7/2017 4:35 PM     I was present and scrubbed throughout the procedure,  I agree with the note above  Tami Kolb MD

## 2017-07-07 NOTE — OR NURSING
Pt feeling better and ok with plan to discharge to the floor. Dr. Velasquez called for signout and agreed to put in note.

## 2017-07-07 NOTE — PROGRESS NOTES
"[facility] Gyn Progress Note     S: Patient is feeling okay, in some pain but tolerable. No nausea vomiting. No dizziness or lightheadedness. No complaints.    O:  Patient Vitals for the past 24 hrs:   BP Temp Temp src Heart Rate Resp SpO2 Height Weight   07/07/17 1804 - - - - - 100 % - -   07/07/17 1800 95/59 - - - - - - -   07/07/17 1745 91/53 98.2  F (36.8  C) Oral 61 14 100 % - -   07/07/17 1730 96/52 - - 58 12 100 % - -   07/07/17 1715 108/58 98.2  F (36.8  C) Oral 86 16 99 % - -   07/07/17 1700 104/68 - - 99 13 100 % - -   07/07/17 1645 115/68 - - 90 13 100 % - -   07/07/17 1637 123/78 98.4  F (36.9  C) Oral - 20 100 % - -   07/07/17 1335 123/71 - - 101 12 96 % - -   07/07/17 1330 112/69 - - 99 13 95 % - -   07/07/17 1325 120/69 - - 103 9 99 % - -   07/07/17 1320 107/78 - - 93 11 99 % - -   07/07/17 1315 116/67 - - 94 11 99 % - -   07/07/17 1310 113/66 - - 89 (!) 7 99 % - -   07/07/17 1305 107/77 - - 99 15 99 % - -   07/07/17 1300 127/76 - - 100 16 98 % - -   07/07/17 1105 124/84 98.1  F (36.7  C) Oral 94 18 98 % 1.676 m (5' 6\") 68.4 kg (150 lb 12.7 oz)     Gen: awake, alert, cooperative, no apparent distress  CV: RRR, no murmer  Respiratory: CTAB, no increased work of breathing on room air  Abdomen: Soft, non distended, non tender, no guarding    Assessment and Plan: Indigo Daniels is a 26 year old with no PMH. female POD#0 s/p abdominal myomectomy. Doing well with no issues.      Postop Cares  - Pain: Toradol followed by ibuprofen, tylenol and PRN oxycodone   - GI: ADAT regular, PRN antiemetics and bowel regimen  - Encourage ambulation  - Pt has yarbrough catheter, remove when ambulating      Jennifer Lucero MD  OB/GYN Resident G1  7/7/2017 6:14 PM          "

## 2017-07-07 NOTE — ANESTHESIA PROCEDURE NOTES
Peripheral Nerve Block Procedure Note    Staff:     Anesthesiologist:  CARRILLO FAIRBANKS  Location: Pre-op  Procedure Start/Stop TImes:      7/7/2017 1:23 PM     7/7/2017 1:29 PM    patient identified, IV checked, site marked, risks and benefits discussed, informed consent, monitors and equipment checked, pre-op evaluation, at physician/surgeon's request and post-op pain management      Correct Patient: Yes      Correct Position: Yes      Correct Site: Yes      Correct Procedure: Yes      Correct Laterality:  Yes    Site Marked:  Yes  Procedure details:     Procedure:  TAP    ASA:  2    Diagnosis:  Open myomectomy    Laterality:  Bilateral    Position:  Supine    Sterile Prep: chloraprep, mask and sterile gloves      Needle:  Short bevel    Needle gauge:  21    Needle length (mm):  110    Ultrasound: Yes      Ultrasound used to identify targeted nerve, plexus, or vascular structure and placed a needle adjacent to it      Permanent Image entered into patiient's record      Abnormal pain on injection: No      Blood Aspirated: No      Paresthesias:  No    Bleeding at site: No      Bolus via:  Needle    Infusion Method:  Single Shot    Complications:  None  Assessment/Narrative:     Injection made incrementally with aspirations every (mL):  5     Bilateral TAP blocks (10ml exparel + 10ml 0.25% bupivacaine w 1:200:000 epi into each TAP site)

## 2017-07-07 NOTE — IP AVS SNAPSHOT
MRN:[id number]                      After Visit Summary   7/7/2017    Indigo Daniels    MRN: [id number]           Thank you!     Thank you for choosing [facility] for your care. Our goal is always to provide you with excellent care. Hearing back from our patients is one way we can continue to improve our services. Please take a few minutes to complete the written survey that you may receive in the mail after you visit with us. Thank you!        Patient Information     Date Of Birth          1990        Designated Caregiver       Most Recent Value    Caregiver    Will someone help with your care after discharge? yes    Name of designated caregiver Paul    Phone number of caregiver none    Caregiver address none      About your hospital stay     You were admitted on:  July 7, 2017 You last received care in the:  [facility]    You were discharged on:  July 9, 2017       Who to Call     For medical emergencies, please call 911.  For non-urgent questions about your medical care, please call your primary care provider or clinic, [phone]  For questions related to your surgery, please call your surgery clinic        Attending Provider     Provider Specialty    Tami Kolb MD OB/Gyn       Primary Care Provider Office Phone # Fax #    Stephon Jacobo [phone]      Your next 10 appointments already scheduled     Aug 17, 2017  9:30 AM CDT   Return Visit with Tami Kolb MD   [facility] ([facility])    [facility] [address]  [address]  [address]  [facility] [address] [phone]              Pending Results     No orders found from 7/5/2017 to 7/8/2017.            Statement of Approval     Ordered          07/09/17 0955  I have reviewed and agree with all the recommendations and orders detailed in this document.  EFFECTIVE NOW     Approved and electronically signed by:  Tami Kolb MD            "  Admission Information     Date & Time Provider Department Dept. Phone    7/7/2017 Tami Kolb MD [facility] [phone]      Your Vitals Were     Blood Pressure Pulse Temperature Respirations Height Weight    99/55 (BP Location: Right arm) 78 97.6  F (36.4  C) (Oral) 16 1.676 m (5' 6\") 69.9 kg (154 lb)    Last Period Pulse Oximetry BMI (Body Mass Index)             06/19/2017 96% 24.86 kg/m2         MyChart Information     [facility] gives you secure access to your electronic health record. If you see a primary care provider, you can also send messages to your care team and make appointments. If you have questions, please call your primary care clinic.  If you do not have a primary care provider, please call [phone] and they will assist you.        Care EveryWhere ID     This is your Care EveryWhere ID. This could be used by other organizations to access your [facility] medical records  [id number]        Equal Access to Services     WYATT SANCHEZ AH: Hadii aad ku hadasho Solamar, waaxda luqadaha, qaybta kaalmada adeegyada, jovan henderson . So [facility] [phone].    ATENCIÓN: Si habla español, tiene a carlson disposición servicios gratuitos de asistencia lingüística. Llame al [phone].    We comply with applicable federal civil rights laws and [place] laws. We do not discriminate on the basis of race, color, national origin, age, disability sex, sexual orientation or gender identity.               Review of your medicines      START taking        Dose / Directions    acetaminophen 32 mg/mL solution   Commonly known as:  TYLENOL        Dose:  975 mg   Take 30.45 mLs (975 mg) by mouth every 8 hours   Quantity:  400 mL   Refills:  0       ibuprofen 100 MG/5ML suspension   Commonly known as:  CHILD IBUPROFEN        600 mg (30 mL) po q 6 hours prn pain   Quantity:  946 mL   Refills:  0       ondansetron 4 MG ODT tab   Commonly known as:  ZOFRAN-ODT        Dose:  4 mg   Take 1 tablet " (4 mg) by mouth every 6 hours as needed for nausea or vomiting   Quantity:  5 tablet   Refills:  0       oxyCODONE 5 MG IR tablet   Commonly known as:  ROXICODONE        Dose:  5-10 mg   Take 1-2 tablets (5-10 mg) by mouth every 4 hours as needed for moderate to severe pain   Quantity:  30 tablet   Refills:  0            Where to get your medicines      These medications were sent to [facility] [place], [place] - [address]  [address] [facility] [address], [facility] [address]     Phone:  [phone]     acetaminophen 32 mg/mL solution    ibuprofen 100 MG/5ML suspension    ondansetron 4 MG ODT tab         Some of these will need a paper prescription and others can be bought over the counter. Ask your nurse if you have questions.     Bring a paper prescription for each of these medications     oxyCODONE 5 MG IR tablet                Protect others around you: Learn how to safely use, store and throw away your medicines at www.disposemymeds.org.             Medication List: This is a list of all your medications and when to take them. Check marks below indicate your daily home schedule. Keep this list as a reference.      Medications           Morning Afternoon Evening Bedtime As Needed    acetaminophen 32 mg/mL solution   Commonly known as:  TYLENOL   Take 30.45 mLs (975 mg) by mouth every 8 hours   Last time this was given:  975 mg on 7/9/2017  5:03 AM                                ibuprofen 100 MG/5ML suspension   Commonly known as:  CHILD IBUPROFEN   600 mg (30 mL) po q 6 hours prn pain                                ondansetron 4 MG ODT tab   Commonly known as:  ZOFRAN-ODT   Take 1 tablet (4 mg) by mouth every 6 hours as needed for nausea or vomiting                                oxyCODONE 5 MG IR tablet   Commonly known as:  ROXICODONE   Take 1-2 tablets (5-10 mg) by mouth every 4 hours as needed for moderate to severe pain   Last time this was given:  5 mg on 7/9/2017  7:38 AM

## 2017-07-07 NOTE — OR NURSING
Pt doing well. Denies pain at this time. Nausea without emesis. Pt medicated x2 for nausea without relief yet. Will continue to monitor closely. Abd dressing c/d/i and not drainage on the peripad.

## 2017-07-07 NOTE — ANESTHESIA CARE TRANSFER NOTE
Patient: Indigo Daniels    Procedure(s):  Abdominal Myomectomy - Wound Class: I-Clean    Diagnosis: Fibroid Uterus  Diagnosis Additional Information: No value filed.    Anesthesia Type:   General, ETT     Note:  Airway :Face Mask  Patient transferred to:PACU  Comments: Arrived in PACU, report to RN, vitals stable, temp 36.9, PIV patent, patient comfortable.      Vitals: (Last set prior to Anesthesia Care Transfer)    CRNA VITALS  7/7/2017 1605 - 7/7/2017 1644      7/7/2017             Resp Rate (set): 10                Electronically Signed By: KIRSTIN Gaines CRNA  July 7, 2017  4:44 PM

## 2017-07-07 NOTE — BRIEF OP NOTE
[facility] OB/GYN Brief Operative Note    Pre-operative diagnosis: Fibroid uterus  Desires fertility  Dysmenorrhea  Abdominal pain   Post-operative diagnosis: Same s/p below stated procedure  12 cm intramural posterior left fibroid  All future pregnancies need primary CS at 36-37 weeks      Procedure: Abdominal myomectomy     Surgeon: Dr. Kolb   Assistant(s): Daija Catalan MD, MPH G4, Jennifer Lucero MD G1, Joel Ortega, MS3      Anesthesia: General endotracheal anesthesia and TAP blocks     Estimated blood loss: 200 mL   Total IV fluids: 1200 mL, crystalloid   Blood transfusion: No transfusion was given during surgery   Total urine output: 300mL, orange c/w pyridium given preop   Drains: None   Specimens: Fibroids x 2    Implants: none   Complications: None apparent    Condition: Patient taken to recovery in stable condition.     Findings: EUA revealed large uterus ~ 14 cm in size, large, firm mass c/w fibroid felt posterior. Normal appearing external genitalia. Normal appearing uterus, bilateral fallopian tubes and ovaries. 14 cm intramural fibroid located in posterior uterine wall along left side. 3 cm subserosal fibroid located medial and superior to left cornua on anterior surface of fundus. Normal appearing bowel. No fascial or intraabdominal adhesions. Hemostatic surgical sites at end of case.       Daija Catalan MD, MPH 7/7/2017 4:30 PM   OB/GYN Resident G4

## 2017-07-07 NOTE — IP AVS SNAPSHOT
[facility]    [address]    [place] [address]    Phone:  [phone]                                       After Visit Summary   7/7/2017    Indigo Daniels    MRN: [id number]           After Visit Summary Signature Page     I have received my discharge instructions, and my questions have been answered. I have discussed any challenges I see with this plan with the nurse or doctor.    ..........................................................................................................................................  Patient/Patient Representative Signature      ..........................................................................................................................................  Patient Representative Print Name and Relationship to Patient    ..................................................               ................................................  Date                                            Time    ..........................................................................................................................................  Reviewed by Signature/Title    ...................................................              ..............................................  Date                                                            Time

## 2017-07-08 LAB — HGB BLD-MCNC: 10.1 G/DL (ref 11.7–15.7)

## 2017-07-08 PROCEDURE — 25000132 ZZH RX MED GY IP 250 OP 250 PS 637: Performed by: STUDENT IN AN ORGANIZED HEALTH CARE EDUCATION/TRAINING PROGRAM

## 2017-07-08 PROCEDURE — 12000001 ZZH R&B MED SURG/OB UMMC

## 2017-07-08 PROCEDURE — 25000128 H RX IP 250 OP 636: Performed by: STUDENT IN AN ORGANIZED HEALTH CARE EDUCATION/TRAINING PROGRAM

## 2017-07-08 PROCEDURE — 36415 COLL VENOUS BLD VENIPUNCTURE: CPT | Performed by: STUDENT IN AN ORGANIZED HEALTH CARE EDUCATION/TRAINING PROGRAM

## 2017-07-08 PROCEDURE — 85018 HEMOGLOBIN: CPT | Performed by: STUDENT IN AN ORGANIZED HEALTH CARE EDUCATION/TRAINING PROGRAM

## 2017-07-08 RX ORDER — IBUPROFEN 100 MG/5ML
600 SUSPENSION, ORAL (FINAL DOSE FORM) ORAL EVERY 6 HOURS PRN
Status: DISCONTINUED | OUTPATIENT
Start: 2017-07-12 | End: 2017-07-09 | Stop reason: HOSPADM

## 2017-07-08 RX ORDER — IBUPROFEN 100 MG/5ML
SUSPENSION, ORAL (FINAL DOSE FORM) ORAL
Qty: 946 ML | Refills: 0 | Status: SHIPPED | OUTPATIENT
Start: 2017-07-08 | End: 2017-08-17

## 2017-07-08 RX ORDER — OXYCODONE HYDROCHLORIDE 5 MG/1
5-10 TABLET ORAL EVERY 4 HOURS PRN
Qty: 30 TABLET | Refills: 0 | Status: SHIPPED | OUTPATIENT
Start: 2017-07-08 | End: 2017-08-17

## 2017-07-08 RX ADMIN — SODIUM CHLORIDE, POTASSIUM CHLORIDE, SODIUM LACTATE AND CALCIUM CHLORIDE: 600; 310; 30; 20 INJECTION, SOLUTION INTRAVENOUS at 01:21

## 2017-07-08 RX ADMIN — SENNOSIDES 8.8 MG: 8.8 SYRUP ORAL at 08:23

## 2017-07-08 RX ADMIN — SENNOSIDES 8.8 MG: 8.8 SYRUP ORAL at 20:11

## 2017-07-08 RX ADMIN — OXYCODONE HYDROCHLORIDE 5 MG: 5 TABLET ORAL at 15:09

## 2017-07-08 RX ADMIN — KETOROLAC TROMETHAMINE 30 MG: 30 INJECTION, SOLUTION INTRAMUSCULAR at 20:13

## 2017-07-08 RX ADMIN — OXYCODONE HYDROCHLORIDE 10 MG: 5 TABLET ORAL at 08:31

## 2017-07-08 RX ADMIN — ACETAMINOPHEN 975 MG: 325 SOLUTION ORAL at 21:32

## 2017-07-08 RX ADMIN — OXYCODONE HYDROCHLORIDE 10 MG: 5 TABLET ORAL at 05:07

## 2017-07-08 RX ADMIN — OXYCODONE HYDROCHLORIDE 10 MG: 5 TABLET ORAL at 01:56

## 2017-07-08 RX ADMIN — DOCUSATE SODIUM 50 MG: 50 LIQUID ORAL at 08:24

## 2017-07-08 RX ADMIN — OXYCODONE HYDROCHLORIDE 5 MG: 5 TABLET ORAL at 18:09

## 2017-07-08 RX ADMIN — ACETAMINOPHEN 975 MG: 325 SOLUTION ORAL at 05:07

## 2017-07-08 RX ADMIN — KETOROLAC TROMETHAMINE 30 MG: 30 INJECTION, SOLUTION INTRAMUSCULAR at 08:19

## 2017-07-08 RX ADMIN — DOCUSATE SODIUM 50 MG: 50 LIQUID ORAL at 20:11

## 2017-07-08 RX ADMIN — OXYCODONE HYDROCHLORIDE 5 MG: 5 TABLET ORAL at 21:32

## 2017-07-08 RX ADMIN — ACETAMINOPHEN 975 MG: 325 SOLUTION ORAL at 14:05

## 2017-07-08 RX ADMIN — KETOROLAC TROMETHAMINE 30 MG: 30 INJECTION, SOLUTION INTRAMUSCULAR at 14:05

## 2017-07-08 RX ADMIN — OXYCODONE HYDROCHLORIDE 5 MG: 5 TABLET ORAL at 11:23

## 2017-07-08 RX ADMIN — ONDANSETRON 4 MG: 2 INJECTION INTRAMUSCULAR; INTRAVENOUS at 14:09

## 2017-07-08 ASSESSMENT — PAIN DESCRIPTION - DESCRIPTORS
DESCRIPTORS: ACHING
DESCRIPTORS: ACHING

## 2017-07-08 NOTE — PLAN OF CARE
Problem: Goal Outcome Summary  Goal: Goal Outcome Summary  Outcome: No Change  VSS, afebrile, A&Ox3, incisional pain is tolerable with prn oxycodone and scheduled tylenol, dressing to lower abdominal area-CDI, no vag drainage noted, denies nausea or dizziness, yarbrough output great- yellow clear, pt has not been out of bed yet, abdominal binder in the room, PIV infusing, SO in the room sleeping, pt able to make needs known, call light in reach length, will continue to monitor and assist.

## 2017-07-08 NOTE — ANESTHESIA POSTPROCEDURE EVALUATION
Patient: Indigo Daniels    Procedure(s):  Abdominal Myomectomy - Wound Class: I-Clean    Diagnosis:Fibroid Uterus  Diagnosis Additional Information: No value filed.    Anesthesia Type:  General, ETT    Note:  Anesthesia Post Evaluation    Patient location during evaluation: PACU and Bedside  Patient participation: Able to fully participate in evaluation  Level of consciousness: awake and alert  Pain management: adequate  Airway patency: patent  Respiratory status: acceptable  Hydration status: acceptable  PONV: none     Anesthetic complications: None          Last vitals:  Vitals:    07/07/17 1845 07/07/17 1911 07/07/17 1953   BP: 106/51 102/47 95/57   Pulse: 81 77    Resp:   18   Temp:   36.3  C (97.4  F)   SpO2: 95%  95%         Electronically Signed By: Chanelle Velasquez MD  July 7, 2017  7:59 PM

## 2017-07-08 NOTE — PLAN OF CARE
Problem: Goal Outcome Summary  Goal: Goal Outcome Summary  Outcome: Improving  Nursing   Post op myomectomy  S- Carey out in am, voided x2   Clear urine.  Some lightheadedness when up, some nausea off and on- stayed in afternoon- zofran IV x1.  States discomfort more when 1st up in am, better after meds.  toradol also given. Able to swallow small pills w applesause, others liquid.  1oxycod po handling pain.  A- stable, pain controlled.  Some sympt when up  R- enc po as able,  Cont meds q3-4hr.  Enc chair, walk in hook w assist.  Cont I+O, IS

## 2017-07-08 NOTE — PROGRESS NOTES
"[facility] Gyn Progress Note      S: Patient is feeling okay, in some pain but tolerable. No nausea vomiting. Has not ambulated yet. Yarbrough still in place.      O:  /46 (BP Location: Left arm)  Pulse 67  Temp 97.8  F (36.6  C) (Oral)  Resp 16  Ht 1.676 m (5' 6\")  Wt 68.4 kg (150 lb 12.7 oz)  LMP 06/19/2017  SpO2 97%  BMI 24.34 kg/m2     Gen: awake, alert, cooperative, no apparent distress  CV: RRR, no murmer  Respiratory: CTAB, no increased work of breathing on room air  Abdomen: Soft, non distended, non tender, no guarding     Assessment and Plan: Indigo Daniels is a 26 year old with no PMH. female POD#1 s/p abdominal myomectomy. Doing well with no issues- encouraged patient to order something for breakfast, ambulate and dicussed yarbrough removal this AM.     Postop Cares  - FEN: regular diet, plan to DC fluids with more PO intake  - Pain: Toradol followed by ibuprofen, tylenol and PRN oxycodone   - Heme: HGB 13 >  > pending  - CV/Resp: NI  - GI: PRN antiemetics and bowel regimen  - : yarbruogh in place, remove POD#1 when ambulating  - MSK/neuro/psych/endo: NI  - Ppx: SCDs    Disposition: pending post op goals     Fadia Clark PGY3  7/8/2017 7:13 AM     Hemoglobin   Date Value Ref Range Status   07/08/2017 10.1 (L) 11.7 - 15.7 g/dL Final   07/05/2017 13.0 11.7 - 15.7 g/dL Final   ]   Appreciate Dr. Clark's note above, patient also seen and examined by me. I agree with the note above.   Tami Kolb MD    "

## 2017-07-08 NOTE — PLAN OF CARE
Problem: Goal Outcome Summary  Goal: Goal Outcome Summary  Outcome: Therapy, progress toward functional goals as expected  Pt arrived on unit at 18:10. Alert, pain tolerable. abd incision CDI, no vag drainage. Pt was offered clear diet but said she was OK. Gave 5mg oxy for pain which pt said was working.

## 2017-07-09 VITALS
TEMPERATURE: 97.6 F | HEIGHT: 66 IN | SYSTOLIC BLOOD PRESSURE: 99 MMHG | OXYGEN SATURATION: 96 % | HEART RATE: 78 BPM | WEIGHT: 154 LBS | DIASTOLIC BLOOD PRESSURE: 55 MMHG | BODY MASS INDEX: 24.75 KG/M2 | RESPIRATION RATE: 16 BRPM

## 2017-07-09 PROCEDURE — 25000128 H RX IP 250 OP 636: Performed by: STUDENT IN AN ORGANIZED HEALTH CARE EDUCATION/TRAINING PROGRAM

## 2017-07-09 PROCEDURE — 25000132 ZZH RX MED GY IP 250 OP 250 PS 637: Performed by: STUDENT IN AN ORGANIZED HEALTH CARE EDUCATION/TRAINING PROGRAM

## 2017-07-09 RX ORDER — ONDANSETRON 4 MG/1
4 TABLET, ORALLY DISINTEGRATING ORAL EVERY 6 HOURS PRN
Qty: 5 TABLET | Refills: 0 | Status: SHIPPED | OUTPATIENT
Start: 2017-07-09 | End: 2017-08-17

## 2017-07-09 RX ADMIN — SENNOSIDES 8.8 MG: 8.8 SYRUP ORAL at 07:38

## 2017-07-09 RX ADMIN — ACETAMINOPHEN 975 MG: 325 SOLUTION ORAL at 05:03

## 2017-07-09 RX ADMIN — KETOROLAC TROMETHAMINE 30 MG: 30 INJECTION, SOLUTION INTRAMUSCULAR at 09:27

## 2017-07-09 RX ADMIN — ONDANSETRON 4 MG: 2 INJECTION INTRAMUSCULAR; INTRAVENOUS at 04:58

## 2017-07-09 RX ADMIN — OXYCODONE HYDROCHLORIDE 5 MG: 5 TABLET ORAL at 00:35

## 2017-07-09 RX ADMIN — OXYCODONE HYDROCHLORIDE 5 MG: 5 TABLET ORAL at 07:38

## 2017-07-09 RX ADMIN — DOCUSATE SODIUM 50 MG: 50 LIQUID ORAL at 07:38

## 2017-07-09 ASSESSMENT — ACTIVITIES OF DAILY LIVING (ADL)
FALL_HISTORY_WITHIN_LAST_SIX_MONTHS: NO
SWALLOWING: 0-->SWALLOWS FOODS/LIQUIDS WITHOUT DIFFICULTY
COGNITION: 0 - NO COGNITION ISSUES REPORTED
TRANSFERRING: 0-->INDEPENDENT
AMBULATION: 0-->INDEPENDENT
RETIRED_COMMUNICATION: 0-->UNDERSTANDS/COMMUNICATES WITHOUT DIFFICULTY
RETIRED_EATING: 0-->INDEPENDENT
DRESS: 0-->INDEPENDENT
TOILETING: 0-->INDEPENDENT
BATHING: 0-->INDEPENDENT

## 2017-07-09 ASSESSMENT — PAIN DESCRIPTION - DESCRIPTORS: DESCRIPTORS: ACHING

## 2017-07-09 NOTE — PLAN OF CARE
Problem: Goal Outcome Summary  Goal: Goal Outcome Summary  Outcome: No Change  Pt is alert and oriented. CMS/Neuros intact. LS clear and equal bilaterally. Bowel sounds present, audible in all quadrants. Adhesive strips in lower abdomen, CDI. Ambulates independently with significant other in the hallway. Voids independently. Tolerating regular diet. Pain managed with 5mg of oxycodone and ice packs. Denies SOB or chest pain. Denies numbness and tingling. Continue POC.    Had one episode of emesis at around @5:20am. States she feels a lot better.

## 2017-07-09 NOTE — PLAN OF CARE
Problem: Discharge Planning  Goal: Discharge Planning (Adult, OB, Behavioral, Peds)  Outcome: Adequate for Discharge Date Met:  07/09/17  D: Pt  A/O x4. VSS . Lungs- CL, no c/o chest pain/ SOB. sats=96  % RA.  . Bowel+, passing gas. HAD BM this morning.. PP- +. No edema. CMS- intact. Pt taking PO REG  food/ fluids well. Voiding Good amounts in BR. Up walks halls INDEPENDENTLY.. Pain/CAPA - tolerable on PO pain meds. ABD  Dressing - C/D/I. stri-strips JULIANNE. All DC meds and instructions reviewed with pt and SO. All meds given to pt.  A: con't to monitor. Call light in reach. Pt able to make needs known.Await transport.

## 2017-07-09 NOTE — PROGRESS NOTES
Gynecology Progress Note    Subjective:  Patient is resting comfortably in bed. States she is tolerating pain with the current medication regimen. Voiding spontaneously. Passing flatus but has not had a BM. She reports that she was intermittently and mildly nauseous yesterday. She drank a large volume of water this morning and then took some pain medication at which time she had one episode of emesis, felt improved following that. No nausea or emesis at this time. Ambulating without dizziness or difficulty.  She denies chest pain, shortness of breath, or other systemic complaints.    Objective:  Vitals:    07/08/17 1421 07/08/17 1614 07/08/17 2134 07/08/17 2200   BP:  102/61 113/56 109/57   BP Location:  Right arm Right arm Right arm   Pulse:  63  78   Resp:  16  18   Temp:  97  F (36.1  C)  98.2  F (36.8  C)   TempSrc:  Oral  Oral   SpO2:  96%  96%   Weight: 69.9 kg (154 lb)      Height:           General:  A&Ox3. NAD. Resting in bed  CV: Regular rate, well perfused  Pulm: Normal respiratory effort.  Abd: Soft, appropriately tender with palpation, nondistended, no rebound tenderness or guarding. Normoactive bowel sounds present. Low transverse incision well healed with steri strips in place, no surrounding erythema or induration.   Ext: Trace lower extremity edema    Assessment/Plan:  Indigo Daniels is a 26 year old female who is POD#2 s/p abdominal myomectomy for uterine fibroids with significant dysmenorrhea. Doing well post-operatively.    Routine post-operative goals: Encourage ambulation and spirometry.  FEN: Continue regular diet  Pain: continue oral medications (ibuprofen, tylenol, PRN oxycodone), pain well controlled  Heme: Hgb 13.0>200>EBL 10.1.  Vital signs stable. Asymptomatic.  : S/p yarbrough catheter, voiding spontaneously  GI: Tolerating PO intake. Continue scheduled stool softeners, antiemetics as needed.  PPX: SCDs while in bed, encourage ambulation    Dispo: Discharge to home likely later today on  POD#2 pending continued tolerance of PO intake as patient is meeting all other postoperative goals    Zoe Osborne MD  OBGYN PGY-3  [phone]  6:54 AM 7/9/2017    Appreciate Dr. Osborne's note above, patient also seen and examined by me. I agree with the note above.  She has tolerated breakfast and feels ready for discharge home today.  Tami Kolb MD

## 2017-07-09 NOTE — PLAN OF CARE
Problem: Goal Outcome Summary  Goal: Goal Outcome Summary  Outcome: No Change  Patient is alert & oriented x3. Able to communicate needs. Pain controlled with prn oxycodone and Toradol, Ice pack for comfort. Denies cp/sob,or headache. Tolerating regular diet. Ambulated in hallway without assist device. Incision CDI, steri strip intact. Very scant vaginal discharge per patient report. SO at bedside. CMS & Neuro's intact. Call light within reach. Continue to monitor per care plan.   BS active, voiding without issue.

## 2017-07-11 LAB — COPATH REPORT: NORMAL

## 2017-08-17 ENCOUNTER — OFFICE VISIT (OUTPATIENT)
Dept: OBGYN | Facility: CLINIC | Age: 27
End: 2017-08-17
Attending: OBSTETRICS & GYNECOLOGY
Payer: COMMERCIAL

## 2017-08-17 VITALS
HEART RATE: 92 BPM | SYSTOLIC BLOOD PRESSURE: 124 MMHG | BODY MASS INDEX: 23.73 KG/M2 | WEIGHT: 147 LBS | DIASTOLIC BLOOD PRESSURE: 68 MMHG

## 2017-08-17 DIAGNOSIS — Z98.890 STATUS POST MYOMECTOMY: Primary | ICD-10-CM

## 2017-08-17 PROCEDURE — 99212 OFFICE O/P EST SF 10 MIN: CPT | Mod: ZF

## 2017-08-17 ASSESSMENT — PAIN SCALES - GENERAL: PAINLEVEL: NO PAIN (0)

## 2017-08-17 NOTE — PROGRESS NOTES
S: 28yo here for post-op from abdominal myomectomy on 7/7/17. Doing well. Reports she has had a normal period without heavy bleeding or dysmenorrhea. Cramping and pain have resolved - she is happy with surgery outcome. Does have some abdominal discomfort when she works out too much and is planning to take it slow. Has resumed sexual intercourse without concern. No fevers, chills, systemic infectious symptoms. No bleeding or drainage from incision. Not on contraception except condoms and is not planning to get pregnant.     O:  /68  Pulse 92  Wt 66.7 kg (147 lb)  LMP 07/20/2017  Breastfeeding? No  BMI 23.73 kg/m2    Gen: In NAD, well appearing young woman  Resp: non labored breathing  Incision: well-healed Pfannenstiel incision; two steri-strips still stuck on    A/P:   28yo 5 weeks post-op from abdominal myomectomy, doing well.   -Pathology reviewed - benign leiomyoma  -Okay to remove last two resilient steri-strips.   -Exercise and intercourse as she is comfortable with.   -Discussed recommendation to wait 6 months before becoming pregnant. No current plans for pregnancy, using condoms. Offered alternative contraception options today but the patient declined.     Follow up not needed; RTC if new concerns    Amie Cardenas MD  OB/Gyn PGY-1  8/17/2017    Appreciate Dr. Cardenas's note above, patient also seen and examined by me. I agree with the note above.   Tami Kolb MD

## 2017-08-17 NOTE — LETTER
8/17/2017       RE: Indigo Daniels  [address]  [place] [address]     Dear Colleague,    Thank you for referring your patient, Indigo Daniels, to the [facility]. Please see a copy of my visit note below.    S: 28yo here for post-op from abdominal myomectomy on 7/7/17. Doing well. Reports she has had a normal period without heavy bleeding or dysmenorrhea. Cramping and pain have resolved - she is happy with surgery outcome. Does have some abdominal discomfort when she works out too much and is planning to take it slow. Has resumed sexual intercourse without concern. No fevers, chills, systemic infectious symptoms. No bleeding or drainage from incision. Not on contraception except condoms and is not planning to get pregnant.     O:  /68  Pulse 92  Wt 66.7 kg (147 lb)  LMP 07/20/2017  Breastfeeding? No  BMI 23.73 kg/m2    Gen: In NAD, well appearing young woman  Resp: non labored breathing  Incision: well-healed Pfannenstiel incision; two steri-strips still stuck on    A/P:   28yo 5 weeks post-op from abdominal myomectomy, doing well.   -Pathology reviewed - benign leiomyoma  -Okay to remove last two resilient steri-strips.   -Exercise and intercourse as she is comfortable with.   -Discussed recommendation to wait 6 months before becoming pregnant. No current plans for pregnancy, using condoms. Offered alternative contraception options today but the patient declined.     Follow up not needed; RTC if new concerns    Amie Cardenas MD  OB/Gyn PGY-1  8/17/2017    Appreciate Dr. Cardenas's note above, patient also seen and examined by me. I agree with the note above.   Tami Kolb MD

## 2017-08-17 NOTE — NURSING NOTE
Chief Complaint   Patient presents with     Surgical Followup     Myomectomy 7/7/2017   Amy Ramos LPN

## 2017-08-17 NOTE — MR AVS SNAPSHOT
After Visit Summary   8/17/2017    Indigo Daniels    MRN: [id number]           Patient Information     Date Of Birth          1990        Visit Information        Provider Department      8/17/2017 9:30 AM Tami Kolb MD [facility]        Today's Diagnoses     Status post myomectomy    -  1       Follow-ups after your visit        Follow-up notes from your care team     Return if symptoms worsen or fail to improve.      Who to contact     Please call your clinic at [phone] to:    Ask questions about your health    Make or cancel appointments    Discuss your medicines    Learn about your test results    Speak to your doctor   If you have compliments or concerns about an experience at your clinic, or if you wish to file a complaint, please contact [facility] Patient Relations at [phone] or email us at [email or web address]         Additional Information About Your Visit        MyChart Information     [employer] gives you secure access to your electronic health record. If you see a primary care provider, you can also send messages to your care team and make appointments. If you have questions, please call your primary care clinic.  If you do not have a primary care provider, please call [phone] and they will assist you.      digitalbox is an electronic gateway that provides easy, online access to your medical records. With digitalbox, you can request a clinic appointment, read your test results, renew a prescription or communicate with your care team.     To access your existing account, please contact your [facility] or call [phone] for assistance.        Care EveryWhere ID     This is your Care EveryWhere ID. This could be used by other organizations to access your [facility] medical records  [id number]        Your Vitals Were     Pulse Last Period Breastfeeding? BMI (Body Mass Index)           92 07/20/2017 No 23.73 kg/m2         Blood Pressure from Last 3 Encounters:   08/17/17 124/68   07/09/17 99/55   05/16/17 127/78    Weight from Last 3 Encounters:   08/17/17 66.7 kg (147 lb)   07/08/17 69.9 kg (154 lb)   05/16/17 68.7 kg (151 lb 8 oz)              Today, you had the following     No orders found for display         Today's Medication Changes          These changes are accurate as of: 8/17/17  4:13 PM.  If you have any questions, ask your nurse or doctor.               Stop taking these medicines if you haven't already. Please contact your care team if you have questions.     acetaminophen 32 mg/mL solution   Commonly known as:  TYLENOL   Stopped by:  Tami Kolb MD           ibuprofen 100 MG/5ML suspension   Commonly known as:  CHILD IBUPROFEN   Stopped by:  Tami Kolb MD           ondansetron 4 MG ODT tab   Commonly known as:  ZOFRAN-ODT   Stopped by:  Tami Kolb MD           oxyCODONE 5 MG IR tablet   Commonly known as:  ROXICODONE   Stopped by:  Tami Kolb MD                    Primary Care Provider Office Phone # Fax #    Stephon SPRING Jacobo [phone]       [facility] [address] [place] [address]        Equal Access to Services     [facility] AH: Hadii aad ku hadasho Soomaali, waaxda luqadaha, qaybta kaalmada adeegyada, jovan henderson . So [facility] [phone].    ATENCIÓN: Si habla español, tiene a carlson disposición servicios gratuitos de asistencia lingüística. Llame al [phone].    We comply with applicable federal civil rights laws and [place] laws. We do not discriminate on the basis of race, color, national origin, age, disability sex, sexual orientation or gender identity.            Thank you!     Thank you for choosing [facility]  for your care. Our goal is always to provide you with excellent care. Hearing back from our patients is one way we can continue to improve our services.  Please take a few minutes to complete the written survey that you may receive in the mail after your visit with us. Thank you!             Your Updated Medication List - Protect others around you: Learn how to safely use, store and throw away your medicines at www.disposemymeds.org.      Notice  As of 8/17/2017  4:13 PM    You have not been prescribed any medications.

## 2019-10-02 ENCOUNTER — HEALTH MAINTENANCE LETTER (OUTPATIENT)
Age: 29
End: 2019-10-02

## 2021-01-15 ENCOUNTER — HEALTH MAINTENANCE LETTER (OUTPATIENT)
Age: 31
End: 2021-01-15

## 2021-09-04 ENCOUNTER — HEALTH MAINTENANCE LETTER (OUTPATIENT)
Age: 31
End: 2021-09-04

## 2022-02-19 ENCOUNTER — HEALTH MAINTENANCE LETTER (OUTPATIENT)
Age: 32
End: 2022-02-19

## 2022-10-22 ENCOUNTER — HEALTH MAINTENANCE LETTER (OUTPATIENT)
Age: 32
End: 2022-10-22

## 2023-04-01 ENCOUNTER — HEALTH MAINTENANCE LETTER (OUTPATIENT)
Age: 33
End: 2023-04-01

## (undated) DEVICE — SOL NACL 0.9% IRRIG 1000ML BOTTLE 2F7124

## (undated) DEVICE — CATH TRAY FOLEY SURESTEP 16FR WDRAIN BAG STLK LATEX A300316A

## (undated) DEVICE — SYR 01ML 27GA 0.5" NDL TBC 309623

## (undated) DEVICE — DRSG TELFA ISLAND 4X8" 7541

## (undated) DEVICE — PREP CHLORAPREP 26ML TINTED ORANGE  260815

## (undated) DEVICE — LINEN LEG DRAPE 5457

## (undated) DEVICE — ESU GROUND PAD UNIVERSAL W/O CORD

## (undated) DEVICE — SUCTION MANIFOLD DORNOCH ULTRA CART UL-CL500

## (undated) DEVICE — SYR 10ML FINGER CONTROL W/O NDL 309695

## (undated) DEVICE — LIGHT HANDLE X2

## (undated) DEVICE — SU VICRYL 3-0 SH 27" J316H

## (undated) DEVICE — LINEN ORTHO PACK 5446

## (undated) DEVICE — SU VICRYL 2-0 CT-1 27" UND J259H

## (undated) DEVICE — GLOVE PROTEXIS W/NEU-THERA 7.0  2D73TE70

## (undated) DEVICE — GLOVE PROTEXIS BLUE W/NEU-THERA 7.5  2D73EB75

## (undated) DEVICE — SPONGE LAP 18X18" X8435

## (undated) DEVICE — Device

## (undated) DEVICE — DRSG STERI STRIP 1/2X4" R1547

## (undated) DEVICE — STRAP KNEE/BODY 31143004

## (undated) DEVICE — WIPES FOLEY CARE SURESTEP PROVON DFC100

## (undated) DEVICE — BARRIER INTERCEED 3X4" 4350

## (undated) DEVICE — LINEN GOWN X4 5410

## (undated) DEVICE — LINEN TOWEL PACK X5 5464

## (undated) DEVICE — DECANTER TRANSFER DEVICE 2008S

## (undated) DEVICE — SU VICRYL 0 CT-1 3X27" J430T

## (undated) DEVICE — PAD PERI INDIV WRAP 11" 2022A

## (undated) DEVICE — SOL NACL 0.9% INJ 250ML BAG 2B1322Q

## (undated) DEVICE — NDL SPINAL 22GA 3.5" QUINCKE 405181

## (undated) DEVICE — PAD CHUX UNDERPAD 30X30"

## (undated) DEVICE — ESU LIGASURE OPEN SEALER/DIVIDER SM JAW 16.5MM LF1212A

## (undated) DEVICE — SU MONOCRYL 4-0 PS-2 18" UND Y496G

## (undated) DEVICE — DRSG ABDOMINAL 07 1/2X8" 7197D

## (undated) RX ORDER — ONDANSETRON 2 MG/ML
INJECTION INTRAMUSCULAR; INTRAVENOUS
Status: DISPENSED
Start: 2017-07-07

## (undated) RX ORDER — CEFAZOLIN SODIUM 2 G/100ML
INJECTION, SOLUTION INTRAVENOUS
Status: DISPENSED
Start: 2017-07-07

## (undated) RX ORDER — ACETAMINOPHEN 325 MG/1
TABLET ORAL
Status: DISPENSED
Start: 2017-07-07

## (undated) RX ORDER — NEOSTIGMINE METHYLSULFATE 5 MG/5 ML
SYRINGE (ML) INTRAVENOUS
Status: DISPENSED
Start: 2017-07-07

## (undated) RX ORDER — PHENAZOPYRIDINE HYDROCHLORIDE 200 MG/1
TABLET, FILM COATED ORAL
Status: DISPENSED
Start: 2017-07-07

## (undated) RX ORDER — GLYCOPYRROLATE 0.2 MG/ML
INJECTION, SOLUTION INTRAMUSCULAR; INTRAVENOUS
Status: DISPENSED
Start: 2017-07-07

## (undated) RX ORDER — FENTANYL CITRATE 50 UG/ML
INJECTION, SOLUTION INTRAMUSCULAR; INTRAVENOUS
Status: DISPENSED
Start: 2017-07-07

## (undated) RX ORDER — PROPOFOL 10 MG/ML
INJECTION, EMULSION INTRAVENOUS
Status: DISPENSED
Start: 2017-07-07